# Patient Record
Sex: FEMALE | Race: WHITE
[De-identification: names, ages, dates, MRNs, and addresses within clinical notes are randomized per-mention and may not be internally consistent; named-entity substitution may affect disease eponyms.]

---

## 2018-03-25 ENCOUNTER — HOSPITAL ENCOUNTER (EMERGENCY)
Dept: HOSPITAL 42 - ED | Age: 83
Discharge: HOME | End: 2018-03-25
Payer: MEDICARE

## 2018-03-25 VITALS — HEART RATE: 70 BPM | OXYGEN SATURATION: 98 % | DIASTOLIC BLOOD PRESSURE: 74 MMHG | SYSTOLIC BLOOD PRESSURE: 145 MMHG

## 2018-03-25 VITALS — BODY MASS INDEX: 23.8 KG/M2

## 2018-03-25 VITALS — RESPIRATION RATE: 18 BRPM | TEMPERATURE: 97.9 F

## 2018-03-25 DIAGNOSIS — R05: Primary | ICD-10-CM

## 2018-03-25 DIAGNOSIS — R42: ICD-10-CM

## 2018-03-25 DIAGNOSIS — I10: ICD-10-CM

## 2018-03-25 LAB
ALBUMIN SERPL-MCNC: 4.2 G/DL (ref 3–4.8)
ALBUMIN/GLOB SERPL: 1.1 {RATIO} (ref 1.1–1.8)
ALT SERPL-CCNC: 33 U/L (ref 7–56)
AST SERPL-CCNC: 34 U/L (ref 14–36)
BASOPHILS # BLD AUTO: 0.01 K/MM3 (ref 0–2)
BASOPHILS NFR BLD: 0.2 % (ref 0–3)
BUN SERPL-MCNC: 11 MG/DL (ref 7–21)
CALCIUM SERPL-MCNC: 9.9 MG/DL (ref 8.4–10.5)
EOSINOPHIL # BLD: 0.1 10*3/UL (ref 0–0.7)
EOSINOPHIL NFR BLD: 2.4 % (ref 1.5–5)
ERYTHROCYTE [DISTWIDTH] IN BLOOD BY AUTOMATED COUNT: 13.6 % (ref 11.5–14.5)
GFR NON-AFRICAN AMERICAN: > 60
GRANULOCYTES # BLD: 1.5 10*3/UL (ref 1.4–6.5)
GRANULOCYTES NFR BLD: 30.7 % (ref 50–68)
HGB BLD-MCNC: 12.2 G/DL (ref 12–16)
LYMPHOCYTES # BLD: 2.7 10*3/UL (ref 1.2–3.4)
LYMPHOCYTES NFR BLD AUTO: 55.1 % (ref 22–35)
MCH RBC QN AUTO: 28.6 PG (ref 25–35)
MCHC RBC AUTO-ENTMCNC: 33.4 G/DL (ref 31–37)
MCV RBC AUTO: 85.7 FL (ref 80–105)
MONOCYTES # BLD AUTO: 0.6 10*3/UL (ref 0.1–0.6)
MONOCYTES NFR BLD: 11.6 % (ref 1–6)
PLATELET # BLD: 287 10^3/UL (ref 120–450)
PMV BLD AUTO: 10.4 FL (ref 7–11)
RBC # BLD AUTO: 4.26 10^6/UL (ref 3.5–6.1)
TROPONIN I SERPL-MCNC: < 0.01 NG/ML
WBC # BLD AUTO: 4.9 10^3/UL (ref 4.5–11)

## 2018-03-25 NOTE — ED PDOC
Arrival/HPI





- General


Chief Complaint: Cough, Cold, Congestion


Time Seen by Provider: 03/25/18 15:31


Historian: Patient





- History of Present Illness


Narrative History of Present Illness (Text): 


03/25/18 17:28


82 year old female presents to the Emergency department complaining of a cough 

for 2-3 weeks that is occasionally productive. Patient also complains of mild 

nausea. Patient states she did not get a flu shot this season. Patient denies 

any fever, chills, chest pain, shortness of breath, vomiting, diarrhea, urinary 

symptoms, back pain, neck pain, headache, dizziness, or any other complaints.


Time/Duration: < month


Symptom Onset: Gradual


Symptom Course: Unchanged


Context: Home





Past Medical History





- Provider Review


Nursing Documentation Reviewed: Yes





- Infectious Disease


Hx of Infectious Diseases: None





- Cardiac


Hx Hypertension: Yes





- Psychiatric


Hx Substance Use: No





- Anesthesia


Hx Anesthesia: No





Family/Social History





- Physician Review


Nursing Documentation Reviewed: Yes


Family/Social History: Unknown Family HX


Smoking Status: Never Smoked


Hx Alcohol Use: No


Hx Substance Use: No





Allergies/Home Meds


Allergies/Adverse Reactions: 


Allergies





No Known Allergies Allergy (Verified 03/25/18 15:23)


 








Home Medications: 


 Home Meds











 Medication  Instructions  Recorded  Confirmed


 


Hydrochlorothiazide [Microzide] 1 cap PO QOTHERDAY 03/25/18 03/25/18


 


Lisinopril [Zestril] 1 tab PO DAILY 03/25/18 03/25/18


 


amLODIPine [Norvasc] 1 tab PO DAILY 03/25/18 03/25/18














Review of Systems





- Physician Review


All systems were reviewed & negative as marked: Yes





- Review of Systems


Constitutional: absent: Fevers, Night Sweats


Respiratory: Cough.  absent: SOB


Cardiovascular: absent: Chest Pain


Gastrointestinal: Nausea.  absent: Diarrhea, Vomiting


Genitourinary Female: absent: Dysuria


Musculoskeletal: absent: Back Pain, Neck Pain


Neurological: absent: Headache, Dizziness





Physical Exam


Vital Signs Reviewed: Yes


Vital Signs











  Temp Pulse Resp BP Pulse Ox


 


 03/25/18 15:27  97.9 F  73  18  160/72 H  99











Temperature: Afebrile


Blood Pressure: Hypertensive


Pulse: Regular


Respiratory Rate: Normal


Appearance: Positive for: Well-Appearing, Non-Toxic, Comfortable


Pain Distress: None


Mental Status: Positive for: Alert and Oriented X 3





- Systems Exam


Head: Present: Atraumatic, Normocephalic


Pupils: Present: PERRL


Extroacular Muscles: Present: EOMI


Conjunctiva: Present: Normal


Mouth: Present: Moist Mucous Membranes


Neck: Present: Normal Range of Motion


Respiratory/Chest: Present: Clear to Auscultation, Good Air Exchange.  No: 

Respiratory Distress, Accessory Muscle Use


Cardiovascular: Present: Regular Rate and Rhythm, Normal S1, S2.  No: Murmurs


Abdomen: Present: Normal Bowel Sounds.  No: Tenderness, Distention, Peritoneal 

Signs


Back: Present: Normal Inspection


Upper Extremity: Present: Normal Inspection.  No: Cyanosis, Edema


Lower Extremity: Present: Normal Inspection.  No: Edema


Neurological: Present: GCS=15, CN II-XII Intact, Speech Normal


Skin: Present: Warm, Dry, Normal Color.  No: Rashes


Psychiatric: Present: Alert, Oriented x 3, Normal Insight, Normal Concentration





Medical Decision Making


ED Course and Treatment: 


03/25/18 17:36


Impression:


82 year old female presents to the Emergency department complaining of a cough 

for 2-3 weeks.





Plan:


-- Chest xray


-- EKG


-- Reassess and disposition





Progress Notes:





03/25/18 18:45


Patient feeling better.  labs and CXR reviewed.  Patient to be discharged with 

Rx and follow up instructions.





- Lab Interpretations


Lab Results: 








 03/25/18 15:25 





 03/25/18 15:25 





 Lab Results





03/25/18 15:25: Sodium 140, Potassium 3.8, Chloride 103, Carbon Dioxide 28, 

Anion Gap 13, BUN 11, Creatinine 0.7, Est GFR (African Amer) > 60, Est GFR (Non-

Af Amer) > 60, Random Glucose 97, Calcium 9.9, Magnesium 2.0, Total Bilirubin 

0.7, AST 34, ALT 33, Alkaline Phosphatase 76, Lactate Dehydrogenase 518, Total 

Creatine Kinase 59, Troponin I < 0.01, Total Protein 8.0, Albumin 4.2, Globulin 

3.8, Albumin/Globulin Ratio 1.1


03/25/18 15:25: Influenza Typ A,B (EIA) Negative for flu a/b


03/25/18 15:25: WBC 4.9, RBC 4.26, Hgb 12.2, Hct 36.5, MCV 85.7, MCH 28.6, MCHC 

33.4, RDW 13.6, Plt Count 287, MPV 10.4, Gran % 30.7 L, Lymph % (Auto) 55.1 H, 

Mono % (Auto) 11.6 H, Eos % (Auto) 2.4, Baso % (Auto) 0.2, Gran # 1.50, Lymph # 

(Auto) 2.7, Mono # (Auto) 0.6, Eos # (Auto) 0.1, Baso # (Auto) 0.01











- RAD Interpretation


Radiology Orders: 








03/25/18 16:01


CHEST PORTABLE [RAD] Stat 














- EKG Interpretation


EKG Interpretation (Text): 


03/25/18


EKG:


Ordered, reviewed, and independently interpreted the EKG.


Rate : 63 BPM


Rhythm : NSR


Interpretation : RBBB. Normal axis.


Interpreted by ED Physician: Yes


Type: 12 lead EKG





- Medication Orders


Current Medication Orders: 











Discontinued Medications





Meclizine HCl (Antivert)  25 mg PO STAT STA


   Stop: 03/25/18 17:53


   Last Admin: 03/25/18 18:13  Dose: 25 mg











- Scribe Statement


The provider has reviewed the documentation as recorded by the Briana Sampson





Provider Scribe Attestation:


All medical record entries made by the Briana were at my direction and 

personally dictated by me. I have reviewed the chart and agree that the record 

accurately reflects my personal performance of the history, physical exam, 

medical decision making, and the department course for this patient. I have 

also personally directed, reviewed, and agree with the discharge instructions 

and disposition.





Disposition/Present on Arrival





- Present on Arrival


Any Indicators Present on Arrival: No


History of DVT/PE: No


History of Uncontrolled Diabetes: No


Urinary Catheter: No


History of Decub. Ulcer: No


History Surgical Site Infection Following: None





- Disposition


Have Diagnosis and Disposition been Completed?: Yes


Diagnosis: 


 Cough, Dizziness





Disposition: HOME/ ROUTINE


Disposition Time: 18:30


Patient Problems: 


 Current Active Problems











Problem Status Onset


 


Cough Acute  


 


Dizziness Acute  











Condition: IMPROVED


Discharge Instructions (ExitCare):  Cough in Adults, Vertigo (a Type of 

Dizziness) (DC)


Print Language: Khmer


Additional Instructions: 





Thank you for letting us take care of you today. The emergency medical care you 

received today was directed at your acute symptoms. If you were prescribed any 

medication, please fill it and take as directed. It may take several days for 

your symptoms to resolve. Return to the Emergency Department if your symptoms 

worsen, do not improve, or if you have any other problems.





Please contact your doctor or call one of the physicians/clinics you have been 

referred to that are listed on the Patient Visit Information form that is 

included in your discharge packet. Bring any paperwork you were given at 

discharge with you along with any medications you are taking to your follow up 

visit. Our treatment cannot replace ongoing medical care by a primary care 

provider (PCP) outside of the emergency department.





Thank you for allowing the [facility] team to be part of your care today.














Follow up with your primary doctor in 3-4 days for re-evaluation and further 

management.











Farnaz por dejarnos atenderlo hoy. La atencin mdica de emergencia que recibi

 hoy estaba dirigida a carlee sntomas agudos. Si le prescribieron algn 

medicamento, llnelo y tome segn las indicaciones. Carlee sntomas pueden tardar 

varios griffiths en resolverse. Regrese al Departamento de Emergencia si carlee s

ntomas empeoran, no mejoran o si tiene algn otro problema.





Comunquese con chavarria mdico o llame a annette de los mdicos / clnicas a los que ha 

sido referido que figura en el formulario de Informacin de visita del paciente 

que se incluye en chavarria paquete de monserrat. Traiga todos los documentos que recibi 

al momento del monserrat junto con los medicamentos que est tomando en chavarria visita de 

seguimiento. Nuestro tratamiento no puede reemplazar la atencin mdica en 

curso por parte de un proveedor de atencin primaria (PCP) fuera del 

departamento de emergencias.





Farnaz por permitir que el equipo de [facility] sea parte de chavarria cuidado 

hoy.














Jaime un seguimiento con chavarria mdico de cabecera en 3-4 griffiths para rosario nueva 

evaluacin y rosario administracin posterior.


Prescriptions: 


Azithromycin [Zithromax] 250 mg PO DAILY #6 tab


Meclizine [Meclizine*] 25 mg PO Q6 PRN #20 tab


 PRN Reason: Dizziness


Referrals: 


Armin Hoffman [Primary Care Provider] - Follow up with primary


Forms:  [id number] (Salvadorean)

## 2018-03-26 NOTE — RAD
HISTORY:

R/O INFILTRATE  



COMPARISON:

No prior. 



FINDINGS:



LUNGS:

No acute infiltrate bilaterally. Calcified granuloma is identified 

lateral to the right hilum.



PLEURA:

No significant pleural effusion identified, no pneumothorax apparent.



CARDIOVASCULAR:

Cardiac size is upper limits normal.  A small hiatal hernia is 

suspected at the inferior midline mediastinum.



OSSEOUS STRUCTURES:

No significant abnormalities.



VISUALIZED UPPER ABDOMEN:

Normal.



OTHER FINDINGS:

None.



IMPRESSION:

No definite acute cardiopulmonary disease appreciable. Solitary 

granuloma seen the right lung with likely hiatal hernia at the 

inferior mediastinum.

## 2018-03-26 NOTE — CARD
--------------- APPROVED REPORT --------------





EKG Measurement

Heart [id number]

MI [id number]

[id number]

[id number]

[id number]



<Conclusion>

Normal sinus rhythm

Right bundle branch block

Abnormal ECG